# Patient Record
Sex: FEMALE | Race: OTHER | HISPANIC OR LATINO | URBAN - METROPOLITAN AREA
[De-identification: names, ages, dates, MRNs, and addresses within clinical notes are randomized per-mention and may not be internally consistent; named-entity substitution may affect disease eponyms.]

---

## 2017-03-17 ENCOUNTER — EMERGENCY (EMERGENCY)
Facility: HOSPITAL | Age: 52
LOS: 1 days | Discharge: HOME | End: 2017-03-17
Attending: EMERGENCY MEDICINE | Admitting: EMERGENCY MEDICINE
Payer: COMMERCIAL

## 2017-03-17 VITALS
RESPIRATION RATE: 18 BRPM | TEMPERATURE: 98 F | HEIGHT: 57 IN | WEIGHT: 119.93 LBS | HEART RATE: 67 BPM | DIASTOLIC BLOOD PRESSURE: 87 MMHG | OXYGEN SATURATION: 97 % | SYSTOLIC BLOOD PRESSURE: 156 MMHG

## 2017-03-17 DIAGNOSIS — M54.2 CERVICALGIA: ICD-10-CM

## 2017-03-17 DIAGNOSIS — Z79.1 LONG TERM (CURRENT) USE OF NON-STEROIDAL ANTI-INFLAMMATORIES (NSAID): ICD-10-CM
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
## 2017-03-17 DIAGNOSIS — M54.5 LOW BACK PAIN: ICD-10-CM

## 2017-03-17 DIAGNOSIS — Z98.89 OTHER SPECIFIED POSTPROCEDURAL STATES: Chronic | ICD-10-CM
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
## 2017-03-17 DIAGNOSIS — S60.221A CONTUSION OF RIGHT HAND, INITIAL ENCOUNTER: ICD-10-CM
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
## 2017-03-17 DIAGNOSIS — M25.522 PAIN IN LEFT ELBOW: ICD-10-CM

## 2017-03-17 PROCEDURE — 73080 X-RAY EXAM OF ELBOW: CPT

## 2017-03-17 PROCEDURE — 99284 EMERGENCY DEPT VISIT MOD MDM: CPT | Mod: 25

## 2017-03-17 PROCEDURE — 72100 X-RAY EXAM L-S SPINE 2/3 VWS: CPT | Mod: 26

## 2017-03-17 PROCEDURE — 73080 X-RAY EXAM OF ELBOW: CPT | Mod: 26,LT

## 2017-03-17 PROCEDURE — 72040 X-RAY EXAM NECK SPINE 2-3 VW: CPT | Mod: 26

## 2017-03-17 PROCEDURE — 73130 X-RAY EXAM OF HAND: CPT

## 2017-03-17 PROCEDURE — 72040 X-RAY EXAM NECK SPINE 2-3 VW: CPT

## 2017-03-17 PROCEDURE — 73130 X-RAY EXAM OF HAND: CPT | Mod: 26

## 2017-03-17 PROCEDURE — 73080 X-RAY EXAM OF ELBOW: CPT | Mod: 26

## 2017-03-17 PROCEDURE — 72100 X-RAY EXAM L-S SPINE 2/3 VWS: CPT

## 2017-03-17 PROCEDURE — 73130 X-RAY EXAM OF HAND: CPT | Mod: 26,RT

## 2017-03-17 RX ORDER — IBUPROFEN 200 MG
1 TABLET ORAL
Qty: 28 | Refills: 0 | OUTPATIENT
Start: 2017-03-17 | End: 2017-03-24

## 2017-03-17 RX ORDER — IBUPROFEN 200 MG
600 TABLET ORAL ONCE
Qty: 0 | Refills: 0 | Status: COMPLETED | OUTPATIENT
Start: 2017-03-17 | End: 2017-03-17

## 2017-03-17 RX ADMIN — Medication 600 MILLIGRAM(S): at 15:59

## 2017-03-17 NOTE — ED PROVIDER NOTE - DIAGNOSTIC INTERPRETATION
No acute fractures in any painful areas. No acute fractures in any painful areas.     ER Physician: Kesha Director  INTERPRETATION:  c spine - no acute fracture; no soft tissue swelling noted; normal bony alignment.  ER Physician: Kesha Director  INTERPRETATION: elbow  no acute fracture; no soft tissue swelling noted; normal bony alignment.  ER Physician: Kesha Director  INTERPRETATION:  R hand -no acute fracture; no soft tissue swelling noted; normal bony alignment.  ER Physician: Kesha Director  INTERPRETATION:  ls spine - no acute fracture; no soft tissue swelling noted; normal bony alignment.

## 2017-03-17 NOTE — ED ADULT TRIAGE NOTE - CHIEF COMPLAINT QUOTE
"I slipped on black ice. I fell on back and hit my head."  Denies LOC. pt reports back pain and headache. Denies chest pain/dizziness. Reports right hand pain.

## 2017-03-17 NOTE — ED PROVIDER NOTE - PROGRESS NOTE DETAILS
CT head not indicated given no neuro deficits and low risk injury mechanism. Skeletal xrays reveal no acute fractures. Pain improved with medications.

## 2017-03-17 NOTE — ED PROVIDER NOTE - OBJECTIVE STATEMENT
51f no medical history presents s/p mechanical fall on black ice. Fell backward and hit the back of her head, L elbow, R wrist and sacrum. Reports posterior neck pain, as well as pain in the L elbow and R thumb, sacrum. No LOC, no vision/auditory problems. No motor deficits/weakness/paresthesias noted. Was able to recover and ambulate afterward. Went to [facility] MD, where neuro exam was concerning for positive romberg, so sent patient to [facility] ED. ROS otherwise negative.

## 2017-03-17 NOTE — ED PROVIDER NOTE - NEUROLOGICAL, MLM
Alert and oriented, no focal deficits, no motor or sensory deficits. Romberg negative. No pronator drift.

## 2017-03-17 NOTE — ED PROVIDER NOTE - CARE PLAN
Principal Discharge DX:	Fall  Instructions for follow-up, activity and diet:	Your body aches are due to your recent fall, however, you have no acute fractures. You should take motrin as needed for pain and follow up with your primary doctor in about 7-10 days. Return to ED for new/worsening neurologic symptoms or pain.

## 2017-03-17 NOTE — ED PROVIDER NOTE - ATTENDING CONTRIBUTION TO CARE
Pt s/p slip and fall on ice just pta c/o head trauma w/o loc or ha (no change in vision/speech/gait, numbness/weakness in ext), neck and low back pain, R base of thumb pain and L elbow pain.  No blood thinners, no other pain/injury, cp, sob, abd pain.  No meds pta.  Well appearing, nc/at, perrl, eomi, op benign, neck supple, no midline ttp neck/back w + l lateral neck and lumbar ttp, lung cta, heart reg, abd soft/nt, ext - ttp w mild ecchymosis thenar eminence w/o bony ttp, cap refill < 3 s, from all fingers R hand, L lower humerus ttp w/o bony ttp elbow, no le ttp, cn grossly intact, motor 5/5, no gross sens deficits,  nl gait.  Pain meds given, xray neg on my read, dc w pain meds.

## 2017-03-17 NOTE — ED PROVIDER NOTE - PLAN OF CARE
Your body aches are due to your recent fall, however, you have no acute fractures. You should take motrin as needed for pain and follow up with your primary doctor in about 7-10 days. Return to ED for new/worsening neurologic symptoms or pain.

## 2019-01-15 NOTE — ED ADULT NURSE NOTE - NEURO ASSESSMENT
Nursing Note by Chao Paige at 08/21/17 03:41 PM     Author:  Chao Paige Service:  (none) Author Type:       Filed:  08/21/17 03:47 PM Encounter Date:  8/21/2017 Status:  Signed     :  Chao Paige ()            error[KV1.1M]           Revision History        User Key Date/Time User Provider Type Action    > KV1.1 08/21/17 03:47 PM Chao Paige  Sign    M - Manual
WDL

## 2023-04-20 NOTE — ED PROVIDER NOTE - GASTROINTESTINAL, MLM
Asymptomatic  See cardiology
Had significant hot flashes  Recently started on testosterone therapy (about 3 months) with significant improvement in symptoms
Sx started 1 year ago, associated with N/V.   - Sees GI   - Had cscope on 12/7/2022 normal findings  - Endoscopy on 4/19/2023, mild gastritis pending biopsy
Abdomen soft, non-tender, no guarding.

## 2024-10-02 NOTE — ED ADULT NURSE NOTE - ED COMFORT CARE
OBSV  STATUS--------------  ADMISSION REVIEW     Payor: Bran Robles #:  [id number]  Authorization Number: N/A    Admit date: N/A  Admit time: N/A       Admitting Physician: Lyssa Johnston MD  Attending Physician:
needed   albuterol sulfate (2.5 MG/3ML) 0.083% Inhalation Nebu Soln,  Take 3 mL (2.5 mg total) by nebulization every 4 (four) hours as needed for Wheezing.    Insulin Lispro 100 UNIT/ML Subcutaneous Solution,  Inject into the skin 3 (three) times daily befo
Glucose 292 (*)     All other components within normal limits   HEPATIC FUNCTION PANEL (7) - Abnormal; Notable for the following:     AST 56 (*)     Bilirubin, Direct 0.3 (*)     All other components within normal limits   LIPASE - Abnormal; Notable for
Jenaro Truong, RN      Pantoprazole Sodium (PROTONIX) 40 mg in Sodium Chloride 0.9 % 10 mL IV push     Date Action Dose Route User    4/5/2018 1356 Given 40 mg Intravenous Lulu Loza RN      Potassium Chloride ER (K-DUR M20) CR tab 40 mEq     Date Action Do
PROVIDER:[TOKEN:[[id number]],FOLLOWUP:[1-3 Days]]
Patient informed